# Patient Record
Sex: FEMALE | Race: BLACK OR AFRICAN AMERICAN
[De-identification: names, ages, dates, MRNs, and addresses within clinical notes are randomized per-mention and may not be internally consistent; named-entity substitution may affect disease eponyms.]

---

## 2020-12-14 PROBLEM — Z00.00 ENCOUNTER FOR PREVENTIVE HEALTH EXAMINATION: Status: ACTIVE | Noted: 2020-12-14

## 2020-12-17 ENCOUNTER — APPOINTMENT (OUTPATIENT)
Dept: UROLOGY | Facility: CLINIC | Age: 50
End: 2020-12-17

## 2021-02-23 ENCOUNTER — APPOINTMENT (OUTPATIENT)
Dept: UROLOGY | Facility: CLINIC | Age: 51
End: 2021-02-23
Payer: COMMERCIAL

## 2021-02-23 VITALS
HEART RATE: 73 BPM | HEIGHT: 60.2 IN | DIASTOLIC BLOOD PRESSURE: 87 MMHG | SYSTOLIC BLOOD PRESSURE: 149 MMHG | WEIGHT: 128 LBS | BODY MASS INDEX: 24.8 KG/M2 | TEMPERATURE: 97.8 F

## 2021-02-23 DIAGNOSIS — Z80.3 FAMILY HISTORY OF MALIGNANT NEOPLASM OF BREAST: ICD-10-CM

## 2021-02-23 DIAGNOSIS — Z87.2 PERSONAL HISTORY OF DISEASES OF THE SKIN AND SUBCUTANEOUS TISSUE: ICD-10-CM

## 2021-02-23 DIAGNOSIS — Z82.49 FAMILY HISTORY OF ISCHEMIC HEART DISEASE AND OTHER DISEASES OF THE CIRCULATORY SYSTEM: ICD-10-CM

## 2021-02-23 DIAGNOSIS — Z86.79 PERSONAL HISTORY OF OTHER DISEASES OF THE CIRCULATORY SYSTEM: ICD-10-CM

## 2021-02-23 DIAGNOSIS — N28.1 CYST OF KIDNEY, ACQUIRED: ICD-10-CM

## 2021-02-23 PROCEDURE — 99072 ADDL SUPL MATRL&STAF TM PHE: CPT

## 2021-02-23 PROCEDURE — 99203 OFFICE O/P NEW LOW 30 MIN: CPT

## 2021-02-23 RX ORDER — AMLODIPINE BESYLATE 5 MG/1
5 TABLET ORAL
Refills: 0 | Status: ACTIVE | COMMUNITY

## 2021-02-25 NOTE — LETTER BODY
[FreeTextEntry1] : Maciel Neely MD\par [address]\[place] [address]\par \par Dear Dr. Neely,\par \par Mala Lóepz presents to the office. She is a 50F referred for evaluation of a renal cyst detected on recently performed renal sonogram. The sonogram was performed to assess the renal vasculature given the patient's underlying hypertension.  The ultrasound was performed in July 2020.  It showed a normal-appearing right kidney.  In the left, there was a complex midpole cyst noted measuring 1.8 cm.  There was also a possible finding of dilated calyces versus multiple cystic changes within the left kidney.  The patient denies flank pain or gross hematuria.  She denies history of kidney stones.  She also has no current lower urinary tract symptoms such as urgency, frequency, gross hematuria or dysuria.  She has had urinary tract infections in the past but her last was over a year ago.  She denies any change in appetite or unintentional weight loss.  She denies constipation.\par \par I was able to review the radiology report but the images were not available today for review.  Based on the interpretation, I think that her next step in evaluation would be to undergo a CT urogram.  This should help both characterize the complex cyst and also determine whether or not there are cystic changes versus possible calyceal dilatation or obstruction of the kidney.  I have explained this to the patient and how this examination will help us to further characterize the ultrasound findings.  She communicates her understanding and is in agreement with the plan for the CT scan.\par \par I will review the CT with her by phone once available for review.  Thank you very much for the kind referral and please do not hesitate to contact me with any questions or concerns.\par \par Sincerely,\par \par \par \par \par Evangelist Gregory MD, FACS\par  of Urology\par Residency \par [facility] [employer] [facility]\par \par [facility]\par Director of Robotics and Minimally Invasive Surgery\par [address] [place] [address]\[place] [address]\par P: [phone]\par F: [phone]\par [email or web address]

## 2021-02-25 NOTE — REVIEW OF SYSTEMS
[Eyesight Problems] : eyesight problems [Urine Infection (bladder/kidney)] : bladder/kidney infection [Wake up at night to urinate  How many times?  ___] : wakes up to urinate [unfilled] times during the night [Anxiety] : anxiety [Hot Flashes] : hot flashes [Negative] : Heme/Lymph [see HPI] : see HPI [FreeTextEntry3] : FREQUENT URINATION

## 2021-03-17 ENCOUNTER — APPOINTMENT (OUTPATIENT)
Dept: CT IMAGING | Facility: IMAGING CENTER | Age: 51
End: 2021-03-17

## 2021-07-18 ENCOUNTER — APPOINTMENT (OUTPATIENT)
Dept: CT IMAGING | Facility: IMAGING CENTER | Age: 51
End: 2021-07-18
Payer: COMMERCIAL

## 2021-07-18 ENCOUNTER — OUTPATIENT (OUTPATIENT)
Dept: OUTPATIENT SERVICES | Facility: HOSPITAL | Age: 51
LOS: 1 days | End: 2021-07-18
Payer: COMMERCIAL

## 2021-07-18 DIAGNOSIS — N28.1 CYST OF KIDNEY, ACQUIRED: ICD-10-CM

## 2021-07-18 PROCEDURE — 74178 CT ABD&PLV WO CNTR FLWD CNTR: CPT

## 2021-07-18 PROCEDURE — 82565 ASSAY OF CREATININE: CPT

## 2021-07-18 PROCEDURE — 74178 CT ABD&PLV WO CNTR FLWD CNTR: CPT | Mod: 26

## 2021-07-20 ENCOUNTER — TRANSCRIPTION ENCOUNTER (OUTPATIENT)
Age: 51
End: 2021-07-20

## 2021-08-17 ENCOUNTER — APPOINTMENT (OUTPATIENT)
Dept: UROLOGY | Facility: CLINIC | Age: 51
End: 2021-08-17

## 2021-11-15 ENCOUNTER — APPOINTMENT (OUTPATIENT)
Dept: CARDIOLOGY | Facility: CLINIC | Age: 51
End: 2021-11-15
Payer: COMMERCIAL

## 2021-11-15 ENCOUNTER — NON-APPOINTMENT (OUTPATIENT)
Age: 51
End: 2021-11-15

## 2021-11-15 VITALS
BODY MASS INDEX: 26.31 KG/M2 | WEIGHT: 134 LBS | HEIGHT: 60 IN | DIASTOLIC BLOOD PRESSURE: 70 MMHG | HEART RATE: 65 BPM | SYSTOLIC BLOOD PRESSURE: 118 MMHG | OXYGEN SATURATION: 98 %

## 2021-11-15 VITALS — TEMPERATURE: 97.5 F

## 2021-11-15 VITALS — SYSTOLIC BLOOD PRESSURE: 140 MMHG | DIASTOLIC BLOOD PRESSURE: 80 MMHG

## 2021-11-15 DIAGNOSIS — Z83.3 FAMILY HISTORY OF DIABETES MELLITUS: ICD-10-CM

## 2021-11-15 DIAGNOSIS — R94.31 ABNORMAL ELECTROCARDIOGRAM [ECG] [EKG]: ICD-10-CM

## 2021-11-15 DIAGNOSIS — R07.9 CHEST PAIN, UNSPECIFIED: ICD-10-CM

## 2021-11-15 DIAGNOSIS — Z82.49 FAMILY HISTORY OF ISCHEMIC HEART DISEASE AND OTHER DISEASES OF THE CIRCULATORY SYSTEM: ICD-10-CM

## 2021-11-15 DIAGNOSIS — Z80.3 FAMILY HISTORY OF MALIGNANT NEOPLASM OF BREAST: ICD-10-CM

## 2021-11-15 DIAGNOSIS — I10 ESSENTIAL (PRIMARY) HYPERTENSION: ICD-10-CM

## 2021-11-15 PROCEDURE — 99203 OFFICE O/P NEW LOW 30 MIN: CPT

## 2021-11-15 PROCEDURE — 93000 ELECTROCARDIOGRAM COMPLETE: CPT

## 2021-11-15 RX ORDER — HYDROCHLOROTHIAZIDE 12.5 MG/1
12.5 CAPSULE ORAL
Refills: 0 | Status: ACTIVE | COMMUNITY

## 2021-11-16 PROBLEM — R94.31 ABNORMAL ECG: Status: ACTIVE | Noted: 2021-11-16

## 2021-11-16 PROBLEM — I10 BENIGN HYPERTENSION: Status: ACTIVE | Noted: 2021-11-16

## 2021-11-16 NOTE — HISTORY OF PRESENT ILLNESS
[FreeTextEntry1] : PAOLA CARRASCO is a 51 year old female who presents with complaints of left sided chest pain.\par She reports chest pain for past 2 weeks - located in posterior and anterior left upper chest, with associated left arm pain.  Symptoms are intermittent and not exacerbated by exertion.  She has not tried any pain medication.\par She also has bilat hand numbness and left lower extremity pain and paraesthesias.\par She saw her PCP who referred her for cardiology and neurology consultations.\par She has significant family history of cardiac disease.\par She has HTN for which she is prescribed amlodipine and hctz but is not taking the latter on regular basis due to freq urination and fear of drug recall.  Her BP has been running in 140 systolic range.\par LBP 11/6/21\par \par

## 2021-11-16 NOTE — PHYSICAL EXAM

## 2021-11-16 NOTE — DISCUSSION/SUMMARY
[FreeTextEntry1] : Advised to take hctz on every other day basis for now to help with BP control.\par Contin amlodipine.\par Sched ex stress test to eval chest pain for evid of myocardial ischemia\par Sched echo\par Agree with neuro eval.\par  Bactrim Counseling:  I discussed with the patient the risks of sulfa antibiotics including but not limited to GI upset, allergic reaction, drug rash, diarrhea, dizziness, photosensitivity, and yeast infections.  Rarely, more serious reactions can occur including but not limited to aplastic anemia, agranulocytosis, methemoglobinemia, blood dyscrasias, liver or kidney failure, lung infiltrates or desquamative/blistering drug rashes.

## 2021-12-15 ENCOUNTER — APPOINTMENT (OUTPATIENT)
Dept: CARDIOLOGY | Facility: CLINIC | Age: 51
End: 2021-12-15

## 2021-12-22 ENCOUNTER — APPOINTMENT (OUTPATIENT)
Dept: CARDIOLOGY | Facility: CLINIC | Age: 51
End: 2021-12-22
Payer: COMMERCIAL

## 2021-12-22 VITALS — TEMPERATURE: 97.8 F

## 2021-12-22 PROCEDURE — 93015 CV STRESS TEST SUPVJ I&R: CPT

## 2021-12-29 ENCOUNTER — APPOINTMENT (OUTPATIENT)
Dept: CARDIOLOGY | Facility: CLINIC | Age: 51
End: 2021-12-29
Payer: COMMERCIAL

## 2021-12-29 PROCEDURE — 93306 TTE W/DOPPLER COMPLETE: CPT
